# Patient Record
Sex: FEMALE | Race: WHITE
[De-identification: names, ages, dates, MRNs, and addresses within clinical notes are randomized per-mention and may not be internally consistent; named-entity substitution may affect disease eponyms.]

---

## 2018-07-08 ENCOUNTER — HOSPITAL ENCOUNTER (EMERGENCY)
Dept: HOSPITAL 8 - ED | Age: 48
Discharge: HOME | End: 2018-07-08
Payer: MEDICARE

## 2018-07-08 VITALS — DIASTOLIC BLOOD PRESSURE: 64 MMHG | SYSTOLIC BLOOD PRESSURE: 112 MMHG

## 2018-07-08 VITALS — BODY MASS INDEX: 33.3 KG/M2 | HEIGHT: 61 IN | WEIGHT: 176.37 LBS

## 2018-07-08 DIAGNOSIS — F17.200: ICD-10-CM

## 2018-07-08 DIAGNOSIS — G40.901: ICD-10-CM

## 2018-07-08 DIAGNOSIS — I10: ICD-10-CM

## 2018-07-08 DIAGNOSIS — E11.9: ICD-10-CM

## 2018-07-08 DIAGNOSIS — F10.120: Primary | ICD-10-CM

## 2018-07-08 LAB
ALBUMIN SERPL-MCNC: 3.2 G/DL (ref 3.4–5)
ANION GAP SERPL CALC-SCNC: 10 MMOL/L (ref 5–15)
BASOPHILS # BLD AUTO: 0.03 X10^3/UL (ref 0–0.1)
BASOPHILS NFR BLD AUTO: 1 % (ref 0–1)
CALCIUM SERPL-MCNC: 8.1 MG/DL (ref 8.5–10.1)
CHLORIDE SERPL-SCNC: 110 MMOL/L (ref 98–107)
CREAT SERPL-MCNC: 0.59 MG/DL (ref 0.55–1.02)
EOSINOPHIL # BLD AUTO: 0.04 X10^3/UL (ref 0–0.4)
EOSINOPHIL NFR BLD AUTO: 1 % (ref 1–7)
ERYTHROCYTE [DISTWIDTH] IN BLOOD BY AUTOMATED COUNT: 19.1 % (ref 9.6–15.2)
LYMPHOCYTES # BLD AUTO: 2.15 X10^3/UL (ref 1–3.4)
LYMPHOCYTES NFR BLD AUTO: 40 % (ref 22–44)
MCH RBC QN AUTO: 28.8 PG (ref 27–34.8)
MCHC RBC AUTO-ENTMCNC: 32.6 G/DL (ref 32.4–35.8)
MCV RBC AUTO: 88.5 FL (ref 80–100)
MD: NO
MONOCYTES # BLD AUTO: 0.39 X10^3/UL (ref 0.2–0.8)
MONOCYTES NFR BLD AUTO: 7 % (ref 2–9)
NEUTROPHILS # BLD AUTO: 2.78 X10^3/UL (ref 1.8–6.8)
NEUTROPHILS NFR BLD AUTO: 52 % (ref 42–75)
PLATELET # BLD AUTO: 460 X10^3/UL (ref 130–400)
PMV BLD AUTO: 7 FL (ref 7.4–10.4)
RBC # BLD AUTO: 4.69 X10^6/UL (ref 3.82–5.3)

## 2018-07-08 PROCEDURE — 36415 COLL VENOUS BLD VENIPUNCTURE: CPT

## 2018-07-08 PROCEDURE — 99284 EMERGENCY DEPT VISIT MOD MDM: CPT

## 2018-07-08 PROCEDURE — 85025 COMPLETE CBC W/AUTO DIFF WBC: CPT

## 2018-07-08 PROCEDURE — 80048 BASIC METABOLIC PNL TOTAL CA: CPT

## 2018-07-08 PROCEDURE — 82040 ASSAY OF SERUM ALBUMIN: CPT

## 2018-07-08 PROCEDURE — 84703 CHORIONIC GONADOTROPIN ASSAY: CPT

## 2018-07-09 ENCOUNTER — HOSPITAL ENCOUNTER (EMERGENCY)
Dept: HOSPITAL 8 - ED | Age: 48
Discharge: HOME | End: 2018-07-09
Payer: MEDICARE

## 2018-07-09 VITALS — WEIGHT: 174.17 LBS | HEIGHT: 61 IN | BODY MASS INDEX: 32.88 KG/M2

## 2018-07-09 VITALS — SYSTOLIC BLOOD PRESSURE: 102 MMHG | DIASTOLIC BLOOD PRESSURE: 68 MMHG

## 2018-07-09 DIAGNOSIS — F19.10: ICD-10-CM

## 2018-07-09 DIAGNOSIS — I10: ICD-10-CM

## 2018-07-09 DIAGNOSIS — F10.229: Primary | ICD-10-CM

## 2018-07-09 DIAGNOSIS — E11.9: ICD-10-CM

## 2018-07-09 DIAGNOSIS — G40.909: ICD-10-CM

## 2018-07-09 PROCEDURE — 99283 EMERGENCY DEPT VISIT LOW MDM: CPT

## 2018-09-08 ENCOUNTER — HOSPITAL ENCOUNTER (EMERGENCY)
Dept: HOSPITAL 8 - ED | Age: 48
Discharge: HOME | End: 2018-09-08
Payer: MEDICARE

## 2018-09-08 VITALS — SYSTOLIC BLOOD PRESSURE: 122 MMHG | DIASTOLIC BLOOD PRESSURE: 74 MMHG

## 2018-09-08 VITALS — HEIGHT: 64 IN | WEIGHT: 143.3 LBS | BODY MASS INDEX: 24.46 KG/M2

## 2018-09-08 DIAGNOSIS — G40.909: ICD-10-CM

## 2018-09-08 DIAGNOSIS — I10: ICD-10-CM

## 2018-09-08 DIAGNOSIS — M54.5: ICD-10-CM

## 2018-09-08 DIAGNOSIS — F10.120: Primary | ICD-10-CM

## 2018-09-08 DIAGNOSIS — E11.9: ICD-10-CM

## 2018-09-08 PROCEDURE — 99284 EMERGENCY DEPT VISIT MOD MDM: CPT

## 2018-09-08 PROCEDURE — 72110 X-RAY EXAM L-2 SPINE 4/>VWS: CPT

## 2018-10-01 ENCOUNTER — HOSPITAL ENCOUNTER (EMERGENCY)
Dept: HOSPITAL 8 - ED | Age: 48
Discharge: HOME | End: 2018-10-01
Payer: MEDICARE

## 2018-10-01 VITALS — WEIGHT: 220.46 LBS | BODY MASS INDEX: 41.62 KG/M2 | HEIGHT: 61 IN

## 2018-10-01 VITALS — SYSTOLIC BLOOD PRESSURE: 132 MMHG | DIASTOLIC BLOOD PRESSURE: 83 MMHG

## 2018-10-01 DIAGNOSIS — Z88.6: ICD-10-CM

## 2018-10-01 DIAGNOSIS — Z88.8: ICD-10-CM

## 2018-10-01 DIAGNOSIS — E11.9: ICD-10-CM

## 2018-10-01 DIAGNOSIS — F10.220: Primary | ICD-10-CM

## 2018-10-01 DIAGNOSIS — I10: ICD-10-CM

## 2018-10-01 DIAGNOSIS — G40.901: ICD-10-CM

## 2018-10-01 PROCEDURE — 99283 EMERGENCY DEPT VISIT LOW MDM: CPT

## 2019-07-26 ENCOUNTER — HOSPITAL ENCOUNTER (EMERGENCY)
Dept: HOSPITAL 8 - ED | Age: 49
LOS: 1 days | Discharge: HOME | End: 2019-07-27
Payer: MEDICARE

## 2019-07-26 VITALS — DIASTOLIC BLOOD PRESSURE: 52 MMHG | SYSTOLIC BLOOD PRESSURE: 99 MMHG

## 2019-07-26 VITALS — BODY MASS INDEX: 30.11 KG/M2 | WEIGHT: 176.37 LBS | HEIGHT: 64 IN

## 2019-07-26 DIAGNOSIS — F10.220: Primary | ICD-10-CM

## 2019-07-26 DIAGNOSIS — E11.9: ICD-10-CM

## 2019-07-26 DIAGNOSIS — I10: ICD-10-CM

## 2019-07-26 PROCEDURE — 99283 EMERGENCY DEPT VISIT LOW MDM: CPT

## 2019-11-19 ENCOUNTER — HOSPITAL ENCOUNTER (EMERGENCY)
Dept: HOSPITAL 8 - ED | Age: 49
Discharge: HOME | End: 2019-11-19
Payer: MEDICARE

## 2019-11-19 VITALS — SYSTOLIC BLOOD PRESSURE: 115 MMHG | DIASTOLIC BLOOD PRESSURE: 79 MMHG

## 2019-11-19 VITALS — BODY MASS INDEX: 25.78 KG/M2 | WEIGHT: 145.51 LBS | HEIGHT: 63 IN

## 2019-11-19 DIAGNOSIS — E11.9: ICD-10-CM
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
## 2019-11-19 DIAGNOSIS — I10: ICD-10-CM

## 2019-11-19 DIAGNOSIS — F10.120: Primary | ICD-10-CM

## 2019-11-19 PROCEDURE — 99283 EMERGENCY DEPT VISIT LOW MDM: CPT

## 2019-11-19 NOTE — NUR
PT AMBULATES WITH UPRIGHT STEADY GAIT.  SET OF CLEAN CLOTHES AND PAIR OF SHOES 
PROVIDED AND PT CHANGED INTO CLOTHES INDEPENDENTLY. PT PROVIDED WITH ALL DAY 
BUS PASS AND VERBALIZES PLAN TO TAKE BUS TO [place] AND STAY WITH HER 
MOTHER.  Patient/Caregiver given discharge instructions and they have confirmed 
that they understand the instructions.  Patient ambulatory with steady gait.

## 2019-11-19 NOTE — NUR
THIS IS A 48 YO FEMALE BIB [facility] DUE TO [employer] CLEARING OUT WELLS UNDERPASS. 
PATIENT IS HOMELESS AND LIVING UNDER OVERPASS, [facility] PICKED UP PATIENT, STATES 
"THERE WERE MULTIPLE VODKA BOTTLES AROUND HER". PATIENT DISORIENTED, SPEAKS FEW 
SLURRED WORDS AT A TIME. PATIENT PLACED ON CONTINUOUS SPO2 AT 96%, CYCLE BP 
Q1HR. PATIENT IS COOL TO TOUCH, GIVEN BLANKET WARMER, ORDERED FOOD TRAY, 
PATIENT SLEEPING AT THIS TIME.

## 2019-12-03 ENCOUNTER — HOSPITAL ENCOUNTER (OUTPATIENT)
Dept: HOSPITAL 8 - STAR | Age: 49
Discharge: HOME | End: 2019-12-03
Attending: ORTHOPAEDIC SURGERY
Payer: COMMERCIAL

## 2019-12-03 DIAGNOSIS — M17.12: ICD-10-CM

## 2019-12-03 DIAGNOSIS — Z01.818: Primary | ICD-10-CM

## 2019-12-03 LAB
ALBUMIN SERPL-MCNC: 3 G/DL (ref 3.4–5)
ALP SERPL-CCNC: 99 U/L (ref 45–117)
ALT SERPL-CCNC: 25 U/L (ref 12–78)
ANION GAP SERPL CALC-SCNC: 9 MMOL/L (ref 5–15)
BASOPHILS # BLD AUTO: 0.13 X10^3/UL (ref 0–0.1)
BASOPHILS NFR BLD AUTO: 1 % (ref 0–1)
BILIRUB SERPL-MCNC: 0.2 MG/DL (ref 0.2–1)
CALCIUM SERPL-MCNC: 8.3 MG/DL (ref 8.5–10.1)
CHLORIDE SERPL-SCNC: 110 MMOL/L (ref 98–107)
CREAT SERPL-MCNC: 0.52 MG/DL (ref 0.55–1.02)
CULTURE INDICATED?: YES
EOSINOPHIL # BLD AUTO: 0.16 X10^3/UL (ref 0–0.4)
EOSINOPHIL NFR BLD AUTO: 2 % (ref 1–7)
ERYTHROCYTE [DISTWIDTH] IN BLOOD BY AUTOMATED COUNT: 24.9 % (ref 9.6–15.2)
LYMPHOCYTES # BLD AUTO: 2.72 X10^3/UL (ref 1–3.4)
LYMPHOCYTES NFR BLD AUTO: 28 % (ref 22–44)
MCH RBC QN AUTO: 24.2 PG (ref 27–34.8)
MCHC RBC AUTO-ENTMCNC: 30.9 G/DL (ref 32.4–35.8)
MCV RBC AUTO: 78.3 FL (ref 80–100)
MD: (no result)
MICROSCOPIC: (no result)
MONOCYTES # BLD AUTO: 0.76 X10^3/UL (ref 0.2–0.8)
MONOCYTES NFR BLD AUTO: 8 % (ref 2–9)
NEUTROPHILS # BLD AUTO: 6.09 X10^3/UL (ref 1.8–6.8)
NEUTROPHILS NFR BLD AUTO: 62 % (ref 42–75)
PLATELET # BLD AUTO: 403 X10^3/UL (ref 130–400)
PMV BLD AUTO: 8 FL (ref 7.4–10.4)
PROT SERPL-MCNC: 7.4 G/DL (ref 6.4–8.2)
RBC # BLD AUTO: 4.02 X10^6/UL (ref 3.82–5.3)

## 2019-12-03 PROCEDURE — 80053 COMPREHEN METABOLIC PANEL: CPT

## 2019-12-03 PROCEDURE — 87086 URINE CULTURE/COLONY COUNT: CPT

## 2019-12-03 PROCEDURE — 87081 CULTURE SCREEN ONLY: CPT

## 2019-12-03 PROCEDURE — 87147 CULTURE TYPE IMMUNOLOGIC: CPT

## 2019-12-03 PROCEDURE — 36415 COLL VENOUS BLD VENIPUNCTURE: CPT

## 2019-12-03 PROCEDURE — 81001 URINALYSIS AUTO W/SCOPE: CPT

## 2019-12-03 PROCEDURE — 85025 COMPLETE CBC W/AUTO DIFF WBC: CPT

## 2020-01-06 ENCOUNTER — HOSPITAL ENCOUNTER (EMERGENCY)
Dept: HOSPITAL 8 - ED | Age: 50
Discharge: LEFT BEFORE DISCHARGE | End: 2020-01-06
Payer: COMMERCIAL

## 2020-01-06 VITALS — HEIGHT: 67 IN | WEIGHT: 135.58 LBS | BODY MASS INDEX: 21.28 KG/M2

## 2020-01-06 VITALS — DIASTOLIC BLOOD PRESSURE: 50 MMHG | SYSTOLIC BLOOD PRESSURE: 108 MMHG

## 2020-01-06 DIAGNOSIS — E11.9: ICD-10-CM

## 2020-01-06 DIAGNOSIS — F17.200: ICD-10-CM

## 2020-01-06 DIAGNOSIS — I10: ICD-10-CM
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
## 2020-01-06 DIAGNOSIS — F10.220: Primary | ICD-10-CM

## 2020-01-06 PROCEDURE — 99283 EMERGENCY DEPT VISIT LOW MDM: CPT

## 2020-01-06 NOTE — NUR
pt calmly sleeping on gurney with HOB elevated, NAD with equal chest rise/fall, 
no needs at this time, call light within reach.

## 2020-01-06 NOTE — NUR
pt continues laying on gurney with HOB elevated sleeping calmly, NAD with equal 
chest rise/fall, no needs at this time, call light within reach.

## 2020-01-06 NOTE — NUR
[facility]IBA from [facility] c/o EtOH (6 empty pint bottles near pt), pt answering 
some questions with slurred responses, drowsy & unable to follow commands, 
denies pain/injury or drug use; no interventions PTA per EMS; comfort measures 
provided, call light within reach.

## 2020-01-06 NOTE — NUR
pt laying on gurney with HOB elevated sleeping calmly, NAD with equal chest 
rise/fall, no needs at this time, call light within reach.

## 2020-01-09 ENCOUNTER — HOSPITAL ENCOUNTER (EMERGENCY)
Dept: HOSPITAL 8 - ED | Age: 50
Discharge: HOME | End: 2020-01-09
Payer: COMMERCIAL

## 2020-01-09 VITALS — SYSTOLIC BLOOD PRESSURE: 113 MMHG | DIASTOLIC BLOOD PRESSURE: 63 MMHG

## 2020-01-09 VITALS — BODY MASS INDEX: 25.59 KG/M2 | HEIGHT: 64 IN | WEIGHT: 149.91 LBS

## 2020-01-09 DIAGNOSIS — I10: ICD-10-CM

## 2020-01-09 DIAGNOSIS — G40.909: ICD-10-CM

## 2020-01-09 DIAGNOSIS — E11.9: ICD-10-CM
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
## 2020-01-09 DIAGNOSIS — F10.220: Primary | ICD-10-CM

## 2020-01-09 DIAGNOSIS — Z88.8: ICD-10-CM

## 2020-01-09 PROCEDURE — 99281 EMR DPT VST MAYX REQ PHY/QHP: CPT

## 2020-01-09 NOTE — NUR
PATIENT BROUGHT IN BY [facility]AN[facility] FROM [facility], COMPLAINTS OF BACK AND NECK PAIN- 
DENIES TRAUMA. PER FRAN[facility] PT FOUND WITH VODKA BOTTLES, ODOR OF ETOH. PATIENT IS 
ALERT AND AWAKE.

## 2020-02-28 ENCOUNTER — HOSPITAL ENCOUNTER (EMERGENCY)
Dept: HOSPITAL 8 - ED | Age: 50
Discharge: LEFT BEFORE DISCHARGE | End: 2020-02-28
Payer: COMMERCIAL

## 2020-02-28 DIAGNOSIS — Z53.21: Primary | ICD-10-CM

## 2020-05-07 NOTE — NUR
PT BIB [facility]. FOUND UNRESPONSIVE ON THE STREET SURROUNDED BY VODKA BOTTLES. PT 
IS BREATHING AND PROTECTING HER AIRWAY. NO GARGLING OR DROOLING NOTED. NO 
TRAUMA NOTED. PT IS RESPONSIVE TO PAINFUL STIMULI. EKG COMPELTE. PT CONNECTED 
TO MONITORING EQUIPMENT

## 2020-05-07 NOTE — NUR
PT ALERT AND EASILY AROUSABLE TO VOICE, SPEAKS WITH SLURRED SPEECH. AOX2. PT 
AFFIRMS CONSUMING ETOH TONIGHT.

## 2020-05-07 NOTE — NUR
-------------------------------------------------------------------------------

           *** Note undone in ED - 05/07/20 at 1419 by AMINATA ***           

-------------------------------------------------------------------------------

PT NOW AROUSABLE, ABLE TO GET NAME AND INFORMATION. MD MEDRANO AT BEDSIDE. CONSULT 
TO KEATON

## 2020-05-07 NOTE — NUR
THIS IS AN UN[facility]IF[facility], UNABLE TO OBTAIN NAME OR , NO 
IDENTIFICATION ON PATIENT. PATIENT WAS FOUND BY [place] PASSED OUT WITH 
TWO EMPTY PINTS OF VODKA NEXT TO HER. BETHANYT IS AROUSABLE TO PAINFUL STIMULI, 
BUT UNABLE TO COMMUNICATE AT THIS TIME. PIV PLACED BY [facility], NO MEDS GIVEN. 
PATIENT PLACED ON 4L NC BY [facility], TITRATED DOWN TO 2L TO MAINTAIN SPO2 GREATER 
THAN 90%. SPO2 AND BP MONITORING IN PLACE.

## 2020-05-07 NOTE — NUR
COLLAR APPLIED TO PT. PT NOW CO LEFT HAND/WRIST PAIN TO PALPIATION AND 
FLEXION/EXTENSION. PROVIDER AWARE.

## 2020-05-07 NOTE — NUR
PT AROUSABLE TO STIMULATION, OBTAINED IDENTITY AND INFORMATION. SPEECH SLURRED, 
VSS, PUPILES EQUAL AND REACTIVE. PT NOT IN DISTRESS OR PAIN, CONSULT TO 
NEUROSURG.

## 2020-08-24 NOTE — NUR
PT SLEEPING IN [facility]. RESPS EVEN AND UNLABORED. BP/SPO2 MONITORS IN PLACE. 
CALL LIGHT WITHIN REACH.

## 2020-08-24 NOTE — NUR
BIBA. REPORT RECEIVED FROM EMS. UNABLE TO TALE CARE OF HER SELF D/T +ETOH. LAST 
DRINK WAS TODAY(ABOUT 1 PINT OF VODKA). DENIES N/V/PAIN. AOX4. INCONTINENT OF 
URINE. RESPS EVEN AND UNLABORED. NO MEDICAL COMPLAINTS. BP/SPO2 MONITORS IN 
PLACE. CALL LIGHT WITHIN REACH.

## 2020-09-05 NOTE — NUR
TASK RN: THIS IS A 50 YO F BIB EMS FOR ETOH AFTER BEING FOUND IN ALLEY OFF OF 
[facility]. PT IS UNABLE TO PROVIDE FULL MEDICAL HX. 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 PT UNABLE TO VERBALIZE HOW MUCH SHE HAS DRANK, 
DENIES DRUG USE. PT AROUSABLE TO VOICE. PT RESTING ON [employer] W/ CALL LIGHT IN 
REACH AND SIDE RAILS UPX2. CONNECTED TO ALL MONITORING, ALLIE PERRY.

## 2020-09-05 NOTE — NUR
Patient/Caregiver given discharge instructions and they have confirmed that 
they understand the instructions.  Patient ambulatory with steady gait.



Pt given bus pass

## 2020-09-29 NOTE — NUR
PT ESCORTED OUT OF ED. PT AMBULATED WITH STEADY GAIT OUT OF ER. PT OFFERED TAXI 
VOUCHER 2 OR 3 TIMES AND WAS EXPLAINED TOO THAT SHE COULD TAKE THE TAXI 
ANYWHERE SHE WANTED TO GO IN TOWN. PT REFUSED THE VOUCHER STATING "I KNOW WHERE 
I AM. I AM OK. I DONT NEED A TAXI. THANK YOU"

## 2020-09-29 NOTE — NUR
PT BIB EMS FOR ETOH. WAS FOUND BY THE [place] [facility] [place] SURROUNDED BY 
EMPTY VODKA BOTTLES. PT IS A0X0 CURRENTLY. PT HAS A [place] AND A [place] [employer] 
CARD. PT WAS SHIVERING AND 2 WARM BLANKETS WITH BLANKET WARMER PROVIDED. PT 
RESTING IN [facility] CONNECTED TO ALL MONITORING EQUIPMENT. VITALS STABLE. WILL 
CONTINUE TO MONITOR.

## 2020-09-30 NOTE — NUR
CHARGE RN: PT. JAYE AT RN STATION, PER NOTE ON WALLET LEFT FROM DAY SHIFT PT. 
PHONE NUMBER WAS CALLED AND MESSAGE WAS LEFT FOR PTLe CEE TAKEN TO SECURITY BY THIS RN.

## 2020-10-11 NOTE — NUR
FSBS 186, PATIENT DROWSY BUT ANSWERING ALL QUESTIONS APPROPRIATELY. STATES "I 
CAN'T EAT THE [place], MY LIVER HURTS", TURKEY SANDWICH AT BEDSIDE FOR PATIENT, 
OFFERED CRACKERS, PATIENT DECLINED. PROVIDED PATIENT WITH ICE CHIPS

## 2020-10-11 NOTE — NUR
PATIENT STILL DROWSY AND SLEEPING, ANSWERS QUESTIONS APPROPRIATELY WHEN 
AROUSED. AROUSES TO VERBAL STIMULI. VSS. CALL LIGHT IN REACH. D10 DRIP INFUSING 
AT 75ML/HR

## 2020-10-11 NOTE — NUR
PT BIB EMS FOR BEING FOUND DOWN ON THE GROUND. PT SMELLED OF ETOH. PER EMS HER 
BS WAS 36 AND SHE WAS GIEN ORAL GLUCOSE. WHEN PT ARRIVED [facility] SHOWED 38. ORTIZ 
STARTED. PT MEDICATED PER MAR. BS HAS STABLIZED. WILL CONTINUE TO MONITOR

## 2020-10-12 NOTE — NUR
PATIENT HAD EMESIS EPISODE AND STOOLED IN THE BED. PATIENT TRANSFERRED TO 
[facility] AND AMBULATORY IN ROOM, PATIENT CLEANED SELF. BED AND LINENS CHANGED. 
PATIENT BACK IN [facility], D10 DRIP STOPPED PER MD ORDERS. MARIE

## 2020-10-12 NOTE — NUR
PATIENT AWAKE, A&OX4, UNABLE TO KEEP FOOD DOWN WITHOUT DRY-HEAVING, AFTER MEDS 
GIVEN. ERP NOTIFIED AND TO ROOM FOR EVAL
